# Patient Record
Sex: MALE | Race: WHITE | NOT HISPANIC OR LATINO | Employment: OTHER | URBAN - METROPOLITAN AREA
[De-identification: names, ages, dates, MRNs, and addresses within clinical notes are randomized per-mention and may not be internally consistent; named-entity substitution may affect disease eponyms.]

---

## 2017-04-05 PROBLEM — R09.89 CAROTID ARTERY BRUIT: Status: ACTIVE | Noted: 2017-04-05

## 2017-11-20 ENCOUNTER — HOSPITAL ENCOUNTER (EMERGENCY)
Facility: OTHER | Age: 72
Discharge: HOME | End: 2017-11-20
Attending: EMERGENCY MEDICINE
Payer: MEDICARE

## 2017-11-20 VITALS
HEIGHT: 70 IN | DIASTOLIC BLOOD PRESSURE: 93 MMHG | TEMPERATURE: 98 F | RESPIRATION RATE: 18 BRPM | SYSTOLIC BLOOD PRESSURE: 188 MMHG | HEART RATE: 70 BPM | WEIGHT: 210 LBS | OXYGEN SATURATION: 99 % | BODY MASS INDEX: 30.06 KG/M2

## 2017-11-20 DIAGNOSIS — S63.259A CLOSED DISLOCATION OF FINGER, INITIAL ENCOUNTER: Primary | ICD-10-CM

## 2017-11-20 DIAGNOSIS — T14.8XXA SUPERFICIAL LACERATION: ICD-10-CM

## 2017-11-20 PROCEDURE — 90471 IMMUNIZATION ADMIN: CPT | Performed by: PHYSICIAN ASSISTANT

## 2017-11-20 PROCEDURE — 63600175 PHARM REV CODE 636 W HCPCS: Performed by: PHYSICIAN ASSISTANT

## 2017-11-20 PROCEDURE — 90715 TDAP VACCINE 7 YRS/> IM: CPT | Performed by: PHYSICIAN ASSISTANT

## 2017-11-20 PROCEDURE — 26770 TREAT FINGER DISLOCATION: CPT | Mod: F4

## 2017-11-20 PROCEDURE — 25000003 PHARM REV CODE 250: Performed by: PHYSICIAN ASSISTANT

## 2017-11-20 PROCEDURE — 99284 EMERGENCY DEPT VISIT MOD MDM: CPT | Mod: 25

## 2017-11-20 RX ADMIN — CLOSTRIDIUM TETANI TOXOID ANTIGEN (FORMALDEHYDE INACTIVATED), CORYNEBACTERIUM DIPHTHERIAE TOXOID ANTIGEN (FORMALDEHYDE INACTIVATED), BORDETELLA PERTUSSIS TOXOID ANTIGEN (GLUTARALDEHYDE INACTIVATED), BORDETELLA PERTUSSIS FILAMENTOUS HEMAGGLUTININ ANTIGEN (FORMALDEHYDE INACTIVATED), BORDETELLA PERTUSSIS PERTACTIN ANTIGEN, AND BORDETELLA PERTUSSIS FIMBRIAE 2/3 ANTIGEN 0.5 ML: 5; 2; 2.5; 5; 3; 5 INJECTION, SUSPENSION INTRAMUSCULAR at 12:11

## 2017-11-20 RX ADMIN — BACITRACIN, NEOMYCIN, POLYMYXIN B 1 EACH: 400; 3.5; 5 OINTMENT TOPICAL at 12:11

## 2017-11-20 NOTE — ED PROVIDER NOTES
Encounter Date: 11/20/2017       History     Chief Complaint   Patient presents with    Laceration     Walking down and tripped and fell and cut his RIGHt pinky on an unknown object.  Denies any pain. Wrapped with 2x2 and coban in triage.  Bleeding is controlled     71-year-old male with history of claudication, COPD, dizziness, hyperlipidemia, hypertension syncope presents to the emergency department with complaints of left pinky pain, laceration and deformity.  He states that he tripped and fell while [place].  He denies head injury or LOC.  Last tetanus vaccine is unknown.  He denies any numbness or weakness.  No current treatment for his symptoms.  Reports minimal to no pain      The history is provided by the patient.     Review of patient's allergies indicates:   Allergen Reactions    Demerol [meperidine]      VOMITING     Past Medical History:   Diagnosis Date    Abnormal stress test 06-    positive    Claudication     COPD (chronic obstructive pulmonary disease)     Dizziness     Hyperlipidemia     Hypertension     Syncope      No past surgical history on file.  Family History   Problem Relation Age of Onset    Heart disease Father      Social History   Substance Use Topics    Smoking status: Former Smoker     Packs/day: 2.00    Smokeless tobacco: Not on file    Alcohol use Yes      Comment: social drinker     Review of Systems   Constitutional: Negative for chills and fever.   HENT: Negative for sore throat.    Respiratory: Negative for shortness of breath.    Cardiovascular: Negative for chest pain.   Gastrointestinal: Negative for nausea and vomiting.   Genitourinary: Negative for dysuria.   Musculoskeletal: Positive for arthralgias and joint swelling. Negative for back pain.   Skin: Positive for wound. Negative for rash.   Neurological: Negative for dizziness, syncope, weakness, light-headedness, numbness and headaches.   Hematological: Does not bruise/bleed easily.        Physical Exam     Initial Vitals [11/20/17 1144]   BP Pulse Resp Temp SpO2   (!) 181/86 78 17 98.1 °F (36.7 °C) 98 %      MAP       117.67         Physical Exam    Nursing note and vitals reviewed.  Constitutional: Vital signs are normal. He appears well-developed and well-nourished.  Non-toxic appearance. No distress.   HENT:   Head: Normocephalic and atraumatic.   Right Ear: External ear normal.   Left Ear: External ear normal.   Nose: Nose normal.   Eyes: Conjunctivae and lids are normal. No scleral icterus.   Neck: Normal range of motion and phonation normal. Neck supple.   Cardiovascular: Normal rate, regular rhythm, normal heart sounds and intact distal pulses. Exam reveals no gallop and no friction rub.    No murmur heard.  Abdominal: Normal appearance.   Musculoskeletal:        Left hand: He exhibits decreased range of motion, deformity and laceration. Normal sensation noted.        Hands:  No obvious deformities, moving all extremities, normal gait  Left pinky-palpable deformity to the left pinky finger with overlying superficial wound.  There is no ecchymosis.  Decreased range of motion.  No significant tenderness palpation.  Capillary refill less than 3 seconds.  Intact distal pulses and no sensory deficits.   Neurological: He is alert and oriented to person, place, and time. He has normal strength. He displays no atrophy. No sensory deficit. He exhibits normal muscle tone. Coordination and gait normal. GCS eye subscore is 4. GCS verbal subscore is 5. GCS motor subscore is 6.   Skin: Skin is warm and dry. Capillary refill takes less than 2 seconds. Bruising and laceration noted. No lesion, no rash and no abscess noted. No erythema.   Psychiatric: He has a normal mood and affect. His speech is normal and behavior is normal. Judgment normal. Cognition and memory are normal.         ED Course   Orthopedic Injury  Date/Time: 11/20/2017 1:04 PM  Performed by: SARAH MCHUGH  Authorized by: SHELLIE  RENA CHAPMAN     Consent Done?:  Not Needed  Injury:     Injury location:  Finger    Location details:  Left little finger    Injury type:  Dislocation    Dislocation type: PIP        Pre-procedure assessment:     Neurovascular status: Neurovascularly intact      Distal perfusion: normal      Neurological function: normal      Range of motion: reduced      Local anesthesia used?: No      Patient sedated?: No        Selections made in this section will also lock the Injury type section above.:     Manipulation performed?: Yes      Reduction successful?: Yes      Confirmation: Reduction confirmed by x-ray      Immobilization:  Splint    Splint type:  Static finger    Supplies used:  Aluminum splint    Complications: No      Specimens: No      Implants: No    Post-procedure assessment:     Neurovascular status: Neurovascularly intact      Distal perfusion: normal      Neurological function: normal      Range of motion: normal      Patient tolerance:  Patient tolerated the procedure well with no immediate complications      Labs Reviewed - No data to display     Imaging Results          X-Ray Finger 2 or More Views Left (Final result)  Result time 11/20/17 13:46:46    Final result by Pool Donato MD (11/20/17 13:46:46)                 Impression:      1.  No acute displaced fracture or dislocation of the fifth digit noting diffuse edema.      Electronically signed by: POOL DONATO MD  Date:     11/20/17  Time:    13:46              Narrative:    Finger 2 more views left    Clinical history: Fifth digit injury      Comparison: None    Findings:  3 views.    There is osteopenia.  There is edema about the fifth digit.  No acute displaced fracture or dislocation of the fifth digit.  No radiopaque foreign body.                              X-Rays:   Independently Interpreted Readings:   Other Readings:  Left pinky finger-no acute fracture or dislocation.  This x-ray was obtained status post reduction the PIP  joint    Medical Decision Making:   History:   Old Medical Records: I decided to obtain old medical records.  Initial Assessment:   71-year-old male with complaints consistent with fall with associated left pinky dislocation and overlying abrasion/superficial laceration.  Afebrile neurovascular intact.  He is alert, healthy and nontoxic appearing.  He is in no apparent distress.  No focal neurological deficits.  Exam documented above.  Independently Interpreted Test(s):   I have ordered and independently interpreted X-rays - see prior notes.  Clinical Tests:   Radiological Study: Ordered and Reviewed  ED Management:  His tetanus vaccine was updated.  The wound was cleaned in the emergency department antibiotic ointment was applied.  Palpable dislocation of the PIP joint of the left pinky finger.  This was reduced by me and confirmed with x-ray.  This was independently interpreted by myself with no evidence of fracture or dislocation.  He was placed in a finger splint and will be discharged home with care instructions.  He is to follow-up with orthopedist in one week if symptoms persist or return for any worsening signs or symptoms.  He states understanding.  This patient was discussed with the attending physician who agrees with treatment plan.  Other:   I have discussed this case with another health care provider.       <> Summary of the Discussion: Rei  This note was created using Dragon Medical dictation.  There may be typographical errors secondary to dictation.                     ED Course      Clinical Impression:     1. Closed dislocation of finger, initial encounter    2. Superficial laceration        Disposition:   Disposition: Discharged  Condition: Stable                        Daya Arreaga PA-C  11/20/17 1240

## 2017-11-20 NOTE — ED TRIAGE NOTES
Pt reports walking down the street and tripped and fell with L 5th finger pain. 5th finger with swelling and bruising. Small laceration to distal outer finger and inner 5th finger. Pain with ROM.

## 2019-01-09 ENCOUNTER — OFFICE VISIT (OUTPATIENT)
Dept: URGENT CARE | Facility: CLINIC | Age: 74
End: 2019-01-09
Payer: MEDICARE

## 2019-01-09 VITALS
DIASTOLIC BLOOD PRESSURE: 61 MMHG | WEIGHT: 204 LBS | TEMPERATURE: 97 F | RESPIRATION RATE: 18 BRPM | OXYGEN SATURATION: 95 % | BODY MASS INDEX: 29.2 KG/M2 | HEIGHT: 70 IN | SYSTOLIC BLOOD PRESSURE: 90 MMHG | HEART RATE: 76 BPM

## 2019-01-09 DIAGNOSIS — R53.1 WEAKNESS: Primary | ICD-10-CM

## 2019-01-09 LAB
BILIRUB UR QL STRIP: NEGATIVE
GLUCOSE SERPL-MCNC: 134 MG/DL (ref 70–110)
GLUCOSE UR QL STRIP: NEGATIVE
KETONES UR QL STRIP: NEGATIVE
LEUKOCYTE ESTERASE UR QL STRIP: NEGATIVE
PH, POC UA: 5.5 (ref 5–8)
POC ANION GAP: 19 MMOL/L (ref 10–20)
POC BLOOD, URINE: NEGATIVE
POC BUN: 32 MMOL/L (ref 8–26)
POC CHLORIDE: 98 MMOL/L (ref 98–109)
POC CREATININE: 2.5 MG/DL (ref 0.6–1.3)
POC HEMATOCRIT: 33 %PCV (ref 42–52)
POC HEMOGLOBIN: 11.2 G/DL (ref 13.5–18)
POC ICA: 1.06 MMOL/L (ref 1.12–1.32)
POC NITRATES, URINE: NEGATIVE
POC POTASSIUM: 3.9 MMOL/L (ref 3.5–4.9)
POC SODIUM: 136 MMOL/L (ref 138–146)
POC TCO2: 24 MMOL/L (ref 24–29)
PROT UR QL STRIP: NEGATIVE
SP GR UR STRIP: 1.01 (ref 1–1.03)
UROBILINOGEN UR STRIP-ACNC: NORMAL (ref 0.3–2.2)

## 2019-01-09 PROCEDURE — 81003 POCT URINALYSIS, DIPSTICK, AUTOMATED, W/O SCOPE: ICD-10-PCS | Mod: QW,S$GLB,, | Performed by: NURSE PRACTITIONER

## 2019-01-09 PROCEDURE — 81003 URINALYSIS AUTO W/O SCOPE: CPT | Mod: QW,S$GLB,, | Performed by: NURSE PRACTITIONER

## 2019-01-09 PROCEDURE — 80047 POCT CHEMISTRY PANEL: ICD-10-PCS | Mod: QW,S$GLB,, | Performed by: NURSE PRACTITIONER

## 2019-01-09 PROCEDURE — 80047 BASIC METABLC PNL IONIZED CA: CPT | Mod: QW,S$GLB,, | Performed by: NURSE PRACTITIONER

## 2019-01-09 NOTE — PROGRESS NOTES
"Subjective:       Patient ID: Justin Mann is a 73 y.o. male.    Vitals:  height is 5' 10" (1.778 m) and weight is 92.5 kg (204 lb). His oral temperature is 97 °F (36.1 °C). His blood pressure is 90/61 and his pulse is 76. His respiration is 18 and oxygen saturation is 95%.     Chief Complaint: GI Problem    Pt states he has been experiencing abd pain and diarrhea intermittently since 1/2/19. Pt states symptoms improved but returned on Friday and "every other day since". Pt states he has taken imodium on the days he has had diarrhea. Deneis symptoms at present time, only c/o generalized weakness, no diarrhea, abd pain or nausea today.     Chem 8 showed some abnormal labs, compared to previous labs no significant changes. Recommended 500ml bolus NS to hydrate pt, + orthostatics, pt refused stating he can get in with his cardiologist tomorrow. Pt instructed to go to ER for worsening symptoms.        GI Problem   The primary symptoms include abdominal pain and diarrhea. Primary symptoms do not include fever, nausea, vomiting or dysuria. The illness began 3 to 5 days ago. Onset quality: waxing and waning.   The illness does not include chills, constipation or back pain.       Constitution: Negative for appetite change, chills, sweating and fever.   Cardiovascular: Negative for chest pain.   Respiratory: Negative for shortness of breath.    Gastrointestinal: Positive for abdominal pain and diarrhea. Negative for abdominal trauma, abdominal bloating, history of abdominal surgery, nausea, vomiting, constipation, dark colored stools and heartburn.   Genitourinary: Negative for dysuria and pelvic pain.   Musculoskeletal: Negative for back pain.       Objective:      Physical Exam   Constitutional: He is oriented to person, place, and time. He appears well-developed and well-nourished.   HENT:   Head: Normocephalic and atraumatic.   Right Ear: External ear normal.   Left Ear: External ear normal.   Nose: Nose normal. "   Mouth/Throat: Mucous membranes are normal.   Eyes: Conjunctivae and lids are normal.   Neck: Trachea normal and full passive range of motion without pain. Neck supple.   Cardiovascular: Normal rate, regular rhythm and normal heart sounds.   Pulmonary/Chest: Effort normal and breath sounds normal. No respiratory distress.   Abdominal: Soft. Normal appearance and bowel sounds are normal. He exhibits no distension, no abdominal bruit, no pulsatile midline mass and no mass. There is no tenderness.   Musculoskeletal: Normal range of motion. He exhibits no edema.   Neurological: He is alert and oriented to person, place, and time. He has normal strength.   Skin: Skin is warm, dry and intact. He is not diaphoretic. No pallor.   Psychiatric: He has a normal mood and affect. His speech is normal and behavior is normal. Judgment and thought content normal. Cognition and memory are normal.   Nursing note and vitals reviewed.      Assessment:       1. Weakness        Plan:         Weakness  -     Orthostatic vital signs  -     POCT Urinalysis, Dipstick, Automated, W/O Scope  -     POCT Chemistry Panel

## 2024-06-04 NOTE — PROGRESS NOTES
VNG Evaluation    Referring physician:  Dr. Wilson    78 y.o. male complains of lightheadedness and imbalance.  Symptoms are sometimes provoked by quick position changes and have been recurring for a while, per patient report.  Mr. Mann reported that symptoms vary in duration.  He denied taking medication that would affect the results of today's evaluation.      Gaze nystagmus was absent.  Oculomotor function was abnormal: saccadic latency and optokinetic gain.  Spontaneous nystagmus was absent.  The head-hanging left Hallpike was negative.  The head-hanging right Hallpike was negative.  Static positional nystagmus was absent.  Unilateral weakness: 7% (right)  Directional preponderance: 2% (right-beating)  RW: 28 d/s  LW: 30 d/s  RC: 15 d/s   d/s  Fixation suppression was normal with the exception of the right warm condition.    Impression:   VNG results show evidence of central oculomotor dysfunction.    Tracing morphology is considered fair due to excessive eye blinks.    Recommendations:   1. Formal vestibular/balance therapy.  2. Referral to Neurology.  3. Follow-up with Dr. Wilson to review the results of today's evaluation.

## 2024-06-05 ENCOUNTER — CLINICAL SUPPORT (OUTPATIENT)
Dept: AUDIOLOGY | Facility: CLINIC | Age: 79
End: 2024-06-05
Payer: MEDICARE

## 2024-06-05 DIAGNOSIS — H81.4 VERTIGO OF CENTRAL ORIGIN: Primary | ICD-10-CM

## 2024-06-05 PROCEDURE — 92540 BASIC VESTIBULAR EVALUATION: CPT | Mod: S$GLB,,, | Performed by: AUDIOLOGIST

## 2024-06-05 PROCEDURE — 92537 CALORIC VSTBLR TEST W/REC: CPT | Mod: S$GLB,,, | Performed by: AUDIOLOGIST
